# Patient Record
Sex: FEMALE | Race: WHITE
[De-identification: names, ages, dates, MRNs, and addresses within clinical notes are randomized per-mention and may not be internally consistent; named-entity substitution may affect disease eponyms.]

---

## 2018-06-06 ENCOUNTER — HOSPITAL ENCOUNTER (OUTPATIENT)
Dept: HOSPITAL 62 - RAD | Age: 38
End: 2018-06-06
Attending: INTERNAL MEDICINE
Payer: OTHER GOVERNMENT

## 2018-06-06 DIAGNOSIS — K22.5: Primary | ICD-10-CM

## 2018-06-06 DIAGNOSIS — R13.10: ICD-10-CM

## 2018-06-06 DIAGNOSIS — K22.8: ICD-10-CM

## 2018-06-06 PROCEDURE — 74220 X-RAY XM ESOPHAGUS 1CNTRST: CPT

## 2018-06-06 NOTE — RADIOLOGY REPORT (SQ)
EXAM DESCRIPTION:  BARIUM SWALLOW ESOPHAGUS



COMPLETED DATE/TIME:  6/6/2018 8:33 am



REASON FOR STUDY:  DYSPHAGIA R13.10  DYSPHAGIA, UNSPECIFIED



COMPARISON:   UPPER GI 4/19/2016 AND 1/28/2015



TECHNIQUE:  Under fluoroscopic guidance, patient ingested thin barium. Fluoroscopic spot images and r
outine radiographic images acquired and stored on PACS.

12 MM BARIUM TABLET GIVEN: No.

NOT GIVEN



LIMITATIONS:  None.



FLUOROSCOPY TIME:  3.3 MINUTES OF FLUOROSCOPY WAS USED.

25 images saved to PACS.



FINDINGS:  NEUROMUSCULAR COORDINATION OF SWALLOW: Normal. No aspiration.

ESOPHAGEAL MOTILITY: Normal peristalsis. No esophageal spasm.

ESOPHAGEAL MUCOSA: There is dilatation of the distal esophagus with a diverticulum projecting off the
 left medial aspect just superior to the patient's lap band.

GASTRO-ESOPHAGEAL JUNCTION: Lap band is in place which appears stable compared to the previous studie
s.  No evidence of erosion or malplacement.

NON-GI TRACT STRUCTURES: No significant finding.

OTHER: No other significant finding.



IMPRESSION:  CHRONIC DILATATION OF THE DISTAL ESOPHAGUS WITH DIVERTICULUM IDENTIFIED.  LAP BAND APPEA
RS INTACT AND STABLE COMPARED TO PREVIOUS STUDIES.



COMMENT:  Quality :  Final reports for procedures using fluoroscopy that document radiation exp
osure indices, or exposure time and number of fluorographic images (if radiation exposure indices are
 not available)



TECHNICAL DOCUMENTATION:  JOB ID:  [id number]

 2011 [employer]- All Rights Reserved



Reading location - IP/workstation name: [facility]

## 2019-06-14 ENCOUNTER — HOSPITAL ENCOUNTER (OUTPATIENT)
Dept: HOSPITAL 62 - RAD | Age: 39
End: 2019-06-14
Attending: INTERNAL MEDICINE
Payer: COMMERCIAL

## 2019-06-14 DIAGNOSIS — R06.00: Primary | ICD-10-CM

## 2019-06-14 PROCEDURE — 78582 LUNG VENTILAT&PERFUS IMAGING: CPT

## 2019-06-14 PROCEDURE — 71046 X-RAY EXAM CHEST 2 VIEWS: CPT

## 2019-06-14 PROCEDURE — A9540 TC99M MAA: HCPCS

## 2019-06-14 PROCEDURE — A9567 TECHNETIUM TC-99M AEROSOL: HCPCS

## 2019-06-14 NOTE — RADIOLOGY REPORT (SQ)
EXAM DESCRIPTION:  NM LUNG VENT/PERF SCAN



COMPLETED DATE/TIME:  6/14/2019 11:44 am



REASON FOR STUDY:  DYSPNEA (R06.00) R06.00  DYSPNEA, UNSPECIFIED I26.99  OTHER PULMONARY EMBOLISM WIT
HOUT ACUTE COR PULMONALE



COMPARISON:  Chest x-ray obtained the same day.



RADIONUCLIDE AND DOSE:  5.25 millicuries TC-99m MAA  Intravenous

29.7 millicuries TC-99m DTPA Inhaled aerosol



TECHNIQUE:  Eight views of the lungs acquired post ventilation of DTPA aerosol.  Eight matching views
 of the lungs acquired following injection of MAA.



LIMITATIONS:  None.



FINDINGS:  VENTILATION: Symmetric and homogeneous distribution of DTPA aerosol during ventilatory pha
se.  No significant areas of photopenia.

PERFUSION: Perfusion images with normal homogenous activity and no wedge-shaped or segmental defects.
  No ventilation-perfusion mismatches.

OTHER: No other significant finding.



IMPRESSION:  NORMAL VENTILATION-PERFUSION LUNG SCAN.  NEGATIVE FOR PULMONARY EMBOLI.



TECHNICAL DOCUMENTATION:  JOB ID:  [id number]

 2011 [employer]- All Rights Reserved



Reading location - IP/workstation name: [email or web address]

## 2019-06-14 NOTE — RADIOLOGY REPORT (SQ)
EXAM DESCRIPTION:  CHEST 2 VIEWS



COMPLETED DATE/TIME:  6/14/2019 10:42 am



REASON FOR STUDY:  DYSPNEA (R06.00)



COMPARISON:  None.



EXAM PARAMETERS:  NUMBER OF VIEWS: two views

TECHNIQUE: Digital Frontal and Lateral radiographic views of the chest acquired.

RADIATION DOSE: NA

LIMITATIONS: none



FINDINGS:  LUNGS AND PLEURA: No opacities, masses or pneumothorax. No pleural effusion.

MEDIASTINUM AND HILAR STRUCTURES: No masses or contour abnormalities.

HEART AND VASCULAR STRUCTURES: Heart normal size.  No evidence for failure.

BONES: No acute findings.

HARDWARE: None in the chest.

OTHER: No other significant finding.



IMPRESSION:  NO ACUTE RADIOGRAPHIC FINDING IN THE CHEST.



TECHNICAL DOCUMENTATION:  JOB ID:  [id number]

 2011 [employer]- All Rights Reserved



Reading location - IP/workstation name: [email or web address]

## 2020-09-08 ENCOUNTER — HOSPITAL ENCOUNTER (OUTPATIENT)
Dept: HOSPITAL 62 - OD | Age: 40
End: 2020-09-08
Payer: COMMERCIAL

## 2020-09-08 DIAGNOSIS — Z01.812: Primary | ICD-10-CM

## 2020-09-08 LAB
ANION GAP SERPL CALC-SCNC: 8 MMOL/L (ref 5–19)
CHLORIDE SERPL-SCNC: 101 MMOL/L (ref 98–107)
CO2 SERPL-SCNC: 29 MMOL/L (ref 22–30)
POTASSIUM SERPL-SCNC: 5.1 MMOL/L (ref 3.6–5)

## 2020-09-08 PROCEDURE — 80051 ELECTROLYTE PANEL: CPT

## 2020-09-08 PROCEDURE — 36415 COLL VENOUS BLD VENIPUNCTURE: CPT
